# Patient Record
Sex: FEMALE
[De-identification: names, ages, dates, MRNs, and addresses within clinical notes are randomized per-mention and may not be internally consistent; named-entity substitution may affect disease eponyms.]

---

## 2020-01-22 ENCOUNTER — HOSPITAL ENCOUNTER (OUTPATIENT)
Dept: HOSPITAL 95 - LAB SHORT | Age: 30
Discharge: HOME | End: 2020-01-22
Attending: OBSTETRICS & GYNECOLOGY
Payer: COMMERCIAL

## 2020-01-22 DIAGNOSIS — O23.591: Primary | ICD-10-CM

## 2020-01-24 LAB — .: (no result)

## 2020-07-07 ENCOUNTER — HOSPITAL ENCOUNTER (OUTPATIENT)
Dept: HOSPITAL 95 - LAB SHORT | Age: 30
Discharge: HOME | End: 2020-07-07
Attending: OBSTETRICS & GYNECOLOGY
Payer: COMMERCIAL

## 2020-07-07 DIAGNOSIS — Z34.83: Primary | ICD-10-CM

## 2020-08-06 ENCOUNTER — HOSPITAL ENCOUNTER (INPATIENT)
Dept: HOSPITAL 95 - OBS | Age: 30
LOS: 1 days | Discharge: HOME | End: 2020-08-07
Attending: OBSTETRICS & GYNECOLOGY | Admitting: OBSTETRICS & GYNECOLOGY
Payer: COMMERCIAL

## 2020-08-06 VITALS — WEIGHT: 266.98 LBS | BODY MASS INDEX: 39.54 KG/M2 | HEIGHT: 69.02 IN

## 2020-08-06 DIAGNOSIS — Z3A.40: ICD-10-CM

## 2020-08-06 DIAGNOSIS — E66.01: ICD-10-CM

## 2020-08-06 DIAGNOSIS — Z87.891: ICD-10-CM

## 2020-08-06 DIAGNOSIS — O48.0: Primary | ICD-10-CM

## 2020-08-06 LAB
BASOPHILS # BLD AUTO: 0.01 K/MM3 (ref 0–0.23)
BASOPHILS NFR BLD AUTO: 0 % (ref 0–2)
DEPRECATED RDW RBC AUTO: 43.7 FL (ref 35.1–46.3)
EOSINOPHIL # BLD AUTO: 0.03 K/MM3 (ref 0–0.68)
EOSINOPHIL NFR BLD AUTO: 0 % (ref 0–6)
ERYTHROCYTE [DISTWIDTH] IN BLOOD BY AUTOMATED COUNT: 13.4 % (ref 11.7–14.2)
HCT VFR BLD AUTO: 36.3 % (ref 33–51)
HGB BLD-MCNC: 11.7 G/DL (ref 11.5–16)
IMM GRANULOCYTES # BLD AUTO: 0.09 K/MM3 (ref 0–0.1)
IMM GRANULOCYTES NFR BLD AUTO: 1 % (ref 0–1)
LYMPHOCYTES # BLD AUTO: 2.09 K/MM3 (ref 0.84–5.2)
LYMPHOCYTES NFR BLD AUTO: 19 % (ref 21–46)
MCHC RBC AUTO-ENTMCNC: 32.2 G/DL (ref 31.5–36.5)
MCV RBC AUTO: 88 FL (ref 80–100)
MONOCYTES # BLD AUTO: 0.78 K/MM3 (ref 0.16–1.47)
MONOCYTES NFR BLD AUTO: 7 % (ref 4–13)
NEUTROPHILS # BLD AUTO: 8.27 K/MM3 (ref 1.96–9.15)
NEUTROPHILS NFR BLD AUTO: 73 % (ref 41–73)
NRBC # BLD AUTO: 0 K/MM3 (ref 0–0.02)
NRBC BLD AUTO-RTO: 0 /100 WBC (ref 0–0.2)
PLATELET # BLD AUTO: 168 K/MM3 (ref 150–400)

## 2020-08-06 PROCEDURE — 0KQM0ZZ REPAIR PERINEUM MUSCLE, OPEN APPROACH: ICD-10-PCS | Performed by: OBSTETRICS & GYNECOLOGY

## 2020-08-06 PROCEDURE — 3E033VJ INTRODUCTION OF OTHER HORMONE INTO PERIPHERAL VEIN, PERCUTANEOUS APPROACH: ICD-10-PCS | Performed by: OBSTETRICS & GYNECOLOGY

## 2020-08-06 PROCEDURE — 3E0R3BZ INTRODUCTION OF ANESTHETIC AGENT INTO SPINAL CANAL, PERCUTANEOUS APPROACH: ICD-10-PCS | Performed by: ANESTHESIOLOGY

## 2020-08-06 PROCEDURE — 10907ZC DRAINAGE OF AMNIOTIC FLUID, THERAPEUTIC FROM PRODUCTS OF CONCEPTION, VIA NATURAL OR ARTIFICIAL OPENING: ICD-10-PCS | Performed by: OBSTETRICS & GYNECOLOGY

## 2020-08-06 PROCEDURE — 00HU33Z INSERTION OF INFUSION DEVICE INTO SPINAL CANAL, PERCUTANEOUS APPROACH: ICD-10-PCS | Performed by: ANESTHESIOLOGY

## 2020-08-07 NOTE — NUR
PT DISCHARGED TO HOME. DISCHARGE INSTRUCTIONS GIVEN. NO QUESTIONS OR CONCERNS
AT THIS TIME. BANDS MATCHED.

## 2020-11-11 ENCOUNTER — HOSPITAL ENCOUNTER (EMERGENCY)
Dept: HOSPITAL 95 - ER | Age: 30
Discharge: HOME | End: 2020-11-11
Payer: COMMERCIAL

## 2020-11-11 VITALS — HEIGHT: 70 IN | WEIGHT: 244.01 LBS | BODY MASS INDEX: 34.93 KG/M2

## 2020-11-11 DIAGNOSIS — R07.9: Primary | ICD-10-CM

## 2020-11-11 DIAGNOSIS — Z87.891: ICD-10-CM

## 2020-11-11 LAB
ALBUMIN SERPL BCP-MCNC: 3.8 G/DL (ref 3.4–5)
ALBUMIN/GLOB SERPL: 1.2 {RATIO} (ref 0.8–1.8)
ALT SERPL W P-5'-P-CCNC: 20 U/L (ref 12–78)
ANION GAP SERPL CALCULATED.4IONS-SCNC: 5 MMOL/L (ref 6–16)
AST SERPL W P-5'-P-CCNC: 10 U/L (ref 12–37)
BASOPHILS # BLD AUTO: 0.02 K/MM3 (ref 0–0.23)
BASOPHILS NFR BLD AUTO: 0 % (ref 0–2)
BILIRUB SERPL-MCNC: 0.2 MG/DL (ref 0.1–1)
BUN SERPL-MCNC: 16 MG/DL (ref 8–24)
CALCIUM SERPL-MCNC: 9.2 MG/DL (ref 8.5–10.1)
CHLORIDE SERPL-SCNC: 109 MMOL/L (ref 98–108)
CO2 SERPL-SCNC: 27 MMOL/L (ref 21–32)
CREAT SERPL-MCNC: 0.87 MG/DL (ref 0.4–1)
DEPRECATED RDW RBC AUTO: 41.9 FL (ref 35.1–46.3)
EOSINOPHIL # BLD AUTO: 0.09 K/MM3 (ref 0–0.68)
EOSINOPHIL NFR BLD AUTO: 1 % (ref 0–6)
ERYTHROCYTE [DISTWIDTH] IN BLOOD BY AUTOMATED COUNT: 13.1 % (ref 11.7–14.2)
GLOBULIN SER CALC-MCNC: 3.1 G/DL (ref 2.2–4)
GLUCOSE SERPL-MCNC: 81 MG/DL (ref 70–99)
HCT VFR BLD AUTO: 38.6 % (ref 33–51)
HGB BLD-MCNC: 13 G/DL (ref 11.5–16)
IMM GRANULOCYTES # BLD AUTO: 0.02 K/MM3 (ref 0–0.1)
IMM GRANULOCYTES NFR BLD AUTO: 0 % (ref 0–1)
LYMPHOCYTES # BLD AUTO: 3.46 K/MM3 (ref 0.84–5.2)
LYMPHOCYTES NFR BLD AUTO: 38 % (ref 21–46)
MCHC RBC AUTO-ENTMCNC: 33.7 G/DL (ref 31.5–36.5)
MCV RBC AUTO: 87 FL (ref 80–100)
MONOCYTES # BLD AUTO: 0.52 K/MM3 (ref 0.16–1.47)
MONOCYTES NFR BLD AUTO: 6 % (ref 4–13)
NEUTROPHILS # BLD AUTO: 5.09 K/MM3 (ref 1.96–9.15)
NEUTROPHILS NFR BLD AUTO: 55 % (ref 41–73)
NRBC # BLD AUTO: 0 K/MM3 (ref 0–0.02)
NRBC BLD AUTO-RTO: 0 /100 WBC (ref 0–0.2)
PLATELET # BLD AUTO: 246 K/MM3 (ref 150–400)
POTASSIUM SERPL-SCNC: 4 MMOL/L (ref 3.5–5.5)
PROT SERPL-MCNC: 6.9 G/DL (ref 6.4–8.2)
SODIUM SERPL-SCNC: 141 MMOL/L (ref 136–145)
TROPONIN I SERPL-MCNC: <0.015 NG/ML (ref 0–0.04)

## 2020-11-20 ENCOUNTER — HOSPITAL ENCOUNTER (OUTPATIENT)
Dept: HOSPITAL 95 - LAB SHORT | Age: 30
Discharge: HOME | End: 2020-11-20
Attending: OBSTETRICS & GYNECOLOGY
Payer: COMMERCIAL

## 2020-11-20 DIAGNOSIS — N76.0: Primary | ICD-10-CM

## 2021-07-12 ENCOUNTER — HOSPITAL ENCOUNTER (OUTPATIENT)
Dept: HOSPITAL 95 - LAB | Age: 31
Discharge: HOME | End: 2021-07-12
Attending: DERMATOLOGY
Payer: COMMERCIAL

## 2021-07-12 DIAGNOSIS — D22.4: Primary | ICD-10-CM
